# Patient Record
(demographics unavailable — no encounter records)

---

## 2024-10-14 NOTE — HISTORY OF PRESENT ILLNESS
[Parents] : parents [FreeTextEntry7] : 2 mth old doing well, all formula, holle, parents concerned because lower gluteal fold seems deep

## 2024-10-14 NOTE — PHYSICAL EXAM
[Alert] : alert [Normocephalic] : normocephalic [Flat Open Anterior [place]] : flat open anterior fontanelle [PERRL] : PERRL [Red Reflex Bilateral] : red reflex bilateral [Normally Placed Ears] : normally placed ears [Auricles Well Formed] : auricles well formed [Clear Tympanic membranes] : clear tympanic membranes [Light reflex present] : light reflex present [Bony landmarks visible] : bony landmarks visible [Nares Patent] : nares patent [Palate Intact] : palate intact [Uvula Midline] : uvula midline [Supple, full passive range of motion] : supple, full passive range of motion [Symmetric Chest Rise] : symmetric chest rise [Clear to Auscultation Bilaterally] : clear to auscultation bilaterally [Regular Rate and Rhythm] : regular rate and rhythm [S1, S2 present] : S1, S2 present [+2 Femoral Pulses] : +2 femoral pulses [Soft] : soft [Bowel Sounds] : bowel sounds present [Normal external genitailia] : normal external genitalia [Central Urethral Opening] : central urethral opening [Testicles Descended Bilaterally] : testicles descended bilaterally [Normally Placed] : normally placed [No Abnormal Lymph Nodes Palpated] : no abnormal lymph nodes palpated [Symmetric Flexed Extremities] : symmetric flexed extremities [Spinal Dimple] : spinal dimple [Startle Reflex] : startle reflex present [Suck Reflex] : suck reflex present [Rooting] : rooting reflex present [Palmar Grasp] : palmar grasp reflex present [Plantar Grasp] : plantar grasp reflex present [Symmetric Danisha] : symmetric Leblanc [Acute Distress] : no acute distress [Discharge] : no discharge [Palpable Masses] : no palpable masses [Murmurs] : no murmurs [Tender] : nontender [Distended] : not distended [Hepatomegaly] : no hepatomegaly [Splenomegaly] : no splenomegaly [Cherry-Ortolani] : negative Cherry-Ortolani [Metastarsus Varus] : no metastarsus varus [Tuft of Hair] : no tuft of hair [Rash and/or lesion present] : no rash/lesion [de-identified] : closed sacral dimple

## 2024-10-14 NOTE — DEVELOPMENTAL MILESTONES
[Vocalizes with simple cooing] : vocalizes with simple cooing [Lifts head and chest in prone] : lifts head and chest in prone [Opens and shuts hands] : opens and shuts hands [Smiles responsively] : does not smile responsively

## 2024-10-14 NOTE — PLAN
[TextEntry] : mom will find out if she had the rsv vaccine, she thinks she may have, if not will rto in 1-2 wks for rsv vaccine

## 2024-11-19 NOTE — HISTORY OF PRESENT ILLNESS
[de-identified] : Nasal congestion [FreeTextEntry6] : Congested x 6 days Waking up at night, but able to settle down Appetite fine Using Nose amador 3-4 times daily-infant does not mind it Also is having less frequent bowel movements.  On Aneta goat milk formula, stools are orange brown, every 2-3 days if not given prune juice with water daily.  Child fusses and bears down without having a BM and gets very fussy prior to actually having a BM Additionally mother is trying to stretch neck since infant has a preference for looking to the left

## 2024-11-19 NOTE — PHYSICAL EXAM
[NL] : soft, nontender, nondistended, normal bowel sounds, no hepatosplenomegaly [FreeTextEntry2] : flat occiput on right, some facial asymmetry, ear molded on right

## 2024-11-19 NOTE — PLAN
[TextEntry] : Supportive care Humidifier Nay use saline drops every 2 hours Follow up if no better or worsens  Referral to Pediatric PT-[facility] or [facility], depending on availability To continue prune juice and water daily. Will likely start some solids after next physical exam to help with bowel motility PT referral

## 2024-12-09 NOTE — DEVELOPMENTAL MILESTONES
[Laughs aloud] : laughs aloud [Turns to voice] : turns to voice [Rolls over prone to supine] : rolls over prone to supine [Supports on elbows & wrists in prone] : supports on elbows and wrists in prone [Keeps hands unfisted] : keeps hands unfisted [Plays with fingers in midline] : plays with fingers in midline [Grasps objects] : grasps objects [Vocalizes with extending cooing] : vocalizes with extending cooing

## 2024-12-09 NOTE — PLAN
[TextEntry] : trial of alimentum or nutramigen, will start oatmeal cereal in 2-3 weeks, try prunes and pears, discussed allergenic food introduction by 5mths

## 2024-12-09 NOTE — PHYSICAL EXAM
[Alert] : alert [Normocephalic] : normocephalic [Flat Open Anterior [place]] : flat open anterior fontanelle [Red Reflex] : red reflex bilateral [PERRL] : PERRL [Normally Placed Ears] : normally placed ears [Auricles Well Formed] : auricles well formed [Clear Tympanic membranes] : clear tympanic membranes [Light reflex present] : light reflex present [Bony landmarks visible] : bony landmarks visible [Nares Patent] : nares patent [Palate Intact] : palate intact [Uvula Midline] : uvula midline [Symmetric Chest Rise] : symmetric chest rise [Clear to Auscultation Bilaterally] : clear to auscultation bilaterally [Regular Rate and Rhythm] : regular rate and rhythm [S1, S2 present] : S1, S2 present [+2 Femoral Pulses] : (+) 2 femoral pulses [Soft] : soft [Bowel Sounds] : bowel sounds present [Central Urethral Opening] : central urethral opening [Testicles Descended] : testicles descended bilaterally [Patent] : patent [Normally Placed] : normally placed [No Abnormal Lymph Nodes Palpated] : no abnormal lymph nodes palpated [Startle Reflex] : startle reflex present [Plantar Grasp] : plantar grasp reflex present [Symmetric Danisha] : symmetric danisha [Acute Distress] : no acute distress [Discharge] : no discharge [Palpable Masses] : no palpable masses [Murmurs] : no murmurs [Tender] : nontender [Distended] : nondistended [Hepatomegaly] : no hepatomegaly [Splenomegaly] : no splenomegaly [Cherry-Ortolani] : negative Cherry-Ortolani [Allis Sign] : negative Allis sign [Spinal Dimple] : no spinal dimple [Tuft of Hair] : no tuft of hair [Rash or Lesions] : no rash/lesions [FreeTextEntry2] : rt plagiocephaly minimal

## 2024-12-09 NOTE — HISTORY OF PRESENT ILLNESS
[Father] : father [Formula ___ oz/feed] : [unfilled] oz of formula per feed [Pasty] : pasty [every ___ day(s)] : every [unfilled] day(s). [In Bassinet/Crib] : sleeps in bassinet/crib [On back] : sleeps on back [Tummy time] : tummy time [No] : No cigarette smoke exposure [Carbon Monoxide Detectors] : Carbon monoxide detectors at home [Smoke Detectors] : Smoke detectors at home. [NO] : No [Exposure to electronic nicotine delivery system] : No exposure to electronic nicotine delivery system [FreeTextEntry7] : 4 mth old doing well, formula fed, having hard time with bms, goes every 2-3 days, when he goes will have thick peanut butter stool,  [de-identified] : Pt once a week for torticollis, doing well

## 2025-02-13 NOTE — HISTORY OF PRESENT ILLNESS
[Parents] : parents [Normal] : Normal [On back] : sleeps on back [Sleeps 12-16 hours per 24 hours (including naps)] : sleeps 12-16 hours per 24 hours (including naps) [Tummy time] : tummy time [Carbon Monoxide Detectors] : Carbon monoxide detectors at home [Smoke Detectors] : Smoke detectors at home. [YES] : Yes [de-identified] : 6 mth old doing well, no concerns [de-identified] : started solids has been doing well but did not start allergenic foods yet, will start this week, yesterday gave apple and seemed uncomfortable at night but parents not sure if related to not having a bm yesterday, today had a bm,

## 2025-02-13 NOTE — DEVELOPMENTAL MILESTONES
[Pats or smiles at reflection] : pats or smiles at reflection [Begins to turn when name called] : begins to turn when name called [Rolls over prone to supine] : rolls over prone to supine [Sits briefly without support] : sits briefly without support [Reaches for object and transfers] : reaches for object and transfers [Rakes small object with 4 fingers] : rakes small object with 4 fingers [[place] small object on surface] : bangs small object on surface [Babbles] : does not babble [FreeTextEntry1] : getting on all fours and rocking

## 2025-02-13 NOTE — PHYSICAL EXAM
[Alert] : alert [Normocephalic] : normocephalic [Flat Open Anterior [place]] : flat open anterior fontanelle [Red Reflex] : red reflex bilateral [PERRL] : PERRL [Normally Placed Ears] : normally placed ears [Auricles Well Formed] : auricles well formed [Clear Tympanic membranes] : clear tympanic membranes [Light reflex present] : light reflex present [Bony landmarks visible] : bony landmarks visible [Nares Patent] : nares patent [Palate Intact] : palate intact [Uvula Midline] : uvula midline [Supple, full passive range of motion] : supple, full passive range of motion [Symmetric Chest Rise] : symmetric chest rise [Clear to Auscultation Bilaterally] : clear to auscultation bilaterally [Regular Rate and Rhythm] : regular rate and rhythm [S1, S2 present] : S1, S2 present [+2 Femoral Pulses] : (+) 2 femoral pulses [Soft] : soft [Bowel Sounds] : bowel sounds present [Central Urethral Opening] : central urethral opening [Testicles Descended] : testicles descended bilaterally [Patent] : patent [Normally Placed] : normally placed [No Abnormal Lymph Nodes Palpated] : no abnormal lymph nodes palpated [Symmetric Buttocks Creases] : symmetric buttocks creases [Plantar Grasp] : plantar grasp reflex present [Cranial Nerves Grossly Intact] : cranial nerves grossly intact [Acute Distress] : no acute distress [Discharge] : no discharge [Tooth Eruption] : no tooth eruption [Palpable Masses] : no palpable masses [Murmurs] : no murmurs [Tender] : nontender [Distended] : nondistended [Hepatomegaly] : no hepatomegaly [Splenomegaly] : no splenomegaly [Cherry-Ortolani] : negative Cherry-Ortolani [Allis Sign] : negative Allis sign [Spinal Dimple] : no spinal dimple [Tuft of Hair] : no tuft of hair [Rash or Lesions] : no rash/lesions

## 2025-04-02 NOTE — PHYSICAL EXAM
[Normal External Genitalia] : normal external genitalia [NL] : normotonic [de-identified] : moist mucous membranes, [de-identified] : multiple oval shaped scaly erythematous patches on the back to the lateral side of trunk bilaterally

## 2025-04-02 NOTE — HISTORY OF PRESENT ILLNESS
[de-identified] : rash [FreeTextEntry6] : - p/w new onset skin rash on the back and fussiness for 1day - mother has sensitive skin, they have moisturizer at home - denies newly introduced soap, shampoo, detergent, outfit, cream - solid food: blueberry introduced - congested recently, but no mucous # recent illness - 2 weeks ago went to ED for croupy cough, but commented only congested and sent home

## 2025-04-02 NOTE — DISCUSSION/SUMMARY
[FreeTextEntry1] : # xerosis - optimization of supportive care: moisturizer BID, zyrtec for pruritus control, no steroid for now - tylenol can be attempted for discomfort - RTC next week if no improvement on moisturizer; may consider fungal etiology

## 2025-04-02 NOTE — HISTORY OF PRESENT ILLNESS
[de-identified] : rash [FreeTextEntry6] : - p/w new onset skin rash on the back and fussiness for 1day - mother has sensitive skin, they have moisturizer at home - denies newly introduced soap, shampoo, detergent, outfit, cream - solid food: blueberry introduced - congested recently, but no mucous # recent illness - 2 weeks ago went to ED for croupy cough, but commented only congested and sent home

## 2025-04-02 NOTE — PHYSICAL EXAM
[Normal External Genitalia] : normal external genitalia [NL] : normotonic [de-identified] : moist mucous membranes, [de-identified] : multiple oval shaped scaly erythematous patches on the back to the lateral side of trunk bilaterally

## 2025-05-15 NOTE — HISTORY OF PRESENT ILLNESS
[NO] : No [Mother] : mother [Normal] : Normal [In Crib] : sleeps in crib [Brushing teeth] : brushing teeth [No] : No cigarette smoke exposure [Carbon Monoxide Detectors] : Carbon monoxide detectors [Smoke Detectors] : Smoke detectors [FreeTextEntry7] : 9 mth old doing well, no concerns [de-identified] : feeding well

## 2025-05-15 NOTE — DEVELOPMENTAL MILESTONES
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

## 2025-05-15 NOTE — DEVELOPMENTAL MILESTONES
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

## 2025-05-15 NOTE — PHYSICAL EXAM
[Alert] : alert [Normocephalic] : normocephalic [Flat Open Anterior [place]] : flat open anterior fontanelle [Red Reflex] : red reflex bilateral [PERRL] : PERRL [Normally Placed Ears] : normally placed ears [Auricles Well Formed] : auricles well formed [Clear Tympanic membranes] : clear tympanic membranes [Light reflex present] : light reflex present [Bony landmarks visible] : bony landmarks visible [Nares Patent] : nares patent [Palate Intact] : palate intact [Uvula Midline] : uvula midline [Supple, full passive range of motion] : supple, full passive range of motion [Symmetric Chest Rise] : symmetric chest rise [Clear to Auscultation Bilaterally] : clear to auscultation bilaterally [Regular Rate and Rhythm] : regular rate and rhythm [S1, S2 present] : S1, S2 present [+2 Femoral Pulses] : (+) 2 femoral pulses [Soft] : soft [Bowel Sounds] : bowel sounds present [Central Urethral Opening] : central urethral opening [Testicles Descended] : testicles descended bilaterally [No Abnormal Lymph Nodes Palpated] : no abnormal lymph nodes palpated [Symmetric Abduction and Rotation of hips] : symmetric abduction and rotation of hips [Straight] : straight [Cranial Nerves Grossly Intact] : cranial nerves grossly intact [Acute Distress] : no acute distress [Excessive Tearing] : no excessive tearing [Discharge] : no discharge [Palpable Masses] : no palpable masses [Murmurs] : no murmurs [Tender] : nontender [Distended] : nondistended [Hepatomegaly] : no hepatomegaly [Splenomegaly] : no splenomegaly [Allis Sign] : negative Allis sign [Rash or Lesions] : no rash/lesions [de-identified] : 2 lower incisors, upper central incisor area swollen

## 2025-05-15 NOTE — HISTORY OF PRESENT ILLNESS
[NO] : No [Mother] : mother [Normal] : Normal [In Crib] : sleeps in crib [Brushing teeth] : brushing teeth [No] : No cigarette smoke exposure [Carbon Monoxide Detectors] : Carbon monoxide detectors [Smoke Detectors] : Smoke detectors [FreeTextEntry7] : 9 mth old doing well, no concerns [de-identified] : feeding well

## 2025-05-15 NOTE — PHYSICAL EXAM
[Alert] : alert [Normocephalic] : normocephalic [Flat Open Anterior [place]] : flat open anterior fontanelle [Red Reflex] : red reflex bilateral [PERRL] : PERRL [Normally Placed Ears] : normally placed ears [Auricles Well Formed] : auricles well formed [Clear Tympanic membranes] : clear tympanic membranes [Light reflex present] : light reflex present [Bony landmarks visible] : bony landmarks visible [Nares Patent] : nares patent [Palate Intact] : palate intact [Uvula Midline] : uvula midline [Supple, full passive range of motion] : supple, full passive range of motion [Symmetric Chest Rise] : symmetric chest rise [Clear to Auscultation Bilaterally] : clear to auscultation bilaterally [Regular Rate and Rhythm] : regular rate and rhythm [S1, S2 present] : S1, S2 present [+2 Femoral Pulses] : (+) 2 femoral pulses [Soft] : soft [Bowel Sounds] : bowel sounds present [Central Urethral Opening] : central urethral opening [Testicles Descended] : testicles descended bilaterally [No Abnormal Lymph Nodes Palpated] : no abnormal lymph nodes palpated [Symmetric Abduction and Rotation of hips] : symmetric abduction and rotation of hips [Straight] : straight [Cranial Nerves Grossly Intact] : cranial nerves grossly intact [Acute Distress] : no acute distress [Excessive Tearing] : no excessive tearing [Discharge] : no discharge [Palpable Masses] : no palpable masses [Murmurs] : no murmurs [Tender] : nontender [Distended] : nondistended [Hepatomegaly] : no hepatomegaly [Splenomegaly] : no splenomegaly [Allis Sign] : negative Allis sign [Rash or Lesions] : no rash/lesions [de-identified] : 2 lower incisors, upper central incisor area swollen

## 2025-06-24 NOTE — HISTORY OF PRESENT ILLNESS
[de-identified] : sunday bandar with fever of 101.3, have been giving motrin and tylenol every 3hrs, did not give anything today congested and started coughing, ate his dinner, and this morning drank his bottle but only half

## 2025-06-24 NOTE — PLAN
[TextEntry] : d/c dad 4-plex negative since stridor in office will start the prednisolone, 7.5mls today then 3.5 mls po QD starting tomorrow for 2 days.  d/c dad to call for any concerns,  discussed with dad option of sending swab for comp viral panel but dad stated would f/u in office if sxs persist or worsen

## 2025-07-16 NOTE — PHYSICAL EXAM
[Supple] : supple [FROM] : full passive range of motion [NL] : warm, clear [FreeTextEntry1] : smiling, on mom's lap appears comfortable [FreeTextEntry5] : no photophobia [de-identified] : mm moist

## 2025-07-16 NOTE — PHYSICAL EXAM
[Supple] : supple [FROM] : full passive range of motion [NL] : warm, clear [FreeTextEntry1] : smiling, on mom's lap appears comfortable [FreeTextEntry5] : no photophobia [de-identified] : mm moist

## 2025-07-16 NOTE — HISTORY OF PRESENT ILLNESS
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

## 2025-07-16 NOTE — HISTORY OF PRESENT ILLNESS
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

## 2025-07-16 NOTE — PLAN
[TextEntry] : d/c mom 4-plex-, option of sending swab to lab for resp panel discussed but mom prefers to hold off for now since unsure, if inusrance will cover it and has been acting his usual. d/c mom if fever continues by tomorrow night to f/u in office for swab to send out and ua, ucx

## 2025-07-18 NOTE — DISCUSSION/SUMMARY
[FreeTextEntry1] : coxsackie related infection/fever nature and management of illness discussed, fever course and contagiousness discussed, expect to gradually improve over next days and fever will stop once viral load resolves and pain management is priority as he's cranky from sore throat, does not need to rotate motrin/tylenol anymore since already been doing that this week, take a break from Tylenol use and focus on motrin only for sore throat, if still has fever not to get too concern since it's normal immune response to fighting a viral infection, focus on hydration, cold drinks/watermelon is good to give him and good handwashing/cleaning to avoid further spread of virus. replace toothbrush/pacifier, etc when all better. 
No

## 2025-07-18 NOTE — HISTORY OF PRESENT ILLNESS
[Fever] : FEVER [EENT/Resp Symptoms] : EENT/RESPIRATORY SYMPTOMS [FreeTextEntry6] : father reporting was here earlier this week for fever and it has continued w/o any other symptoms other than he constantly touches his cheek/neck area and crankier than usual and not eating well, but still is drinking fluids and wetting diapers, no rashes to date

## 2025-07-18 NOTE — PHYSICAL EXAM
[NL] : warm, clear [No Acute Distress] : no acute distress [Alert] : alert [Irritable] : irritable [Consolable] : consolable [Playful] : not playful [Exudate] : no exudate [Ulcerative Lesions] : ulcerative lesions [de-identified] : scattered coxsackie lesions in posterior pharynx  [de-identified] : no rash on hands/feet/body noted

## 2025-07-18 NOTE — REVIEW OF SYSTEMS
[Fussy] : fussy [Fever] : fever [Irritable] : irritability [Appetite Changes] : appetite changes [Negative] : Skin